# Patient Record
Sex: FEMALE | Race: WHITE | NOT HISPANIC OR LATINO | URBAN - METROPOLITAN AREA
[De-identification: names, ages, dates, MRNs, and addresses within clinical notes are randomized per-mention and may not be internally consistent; named-entity substitution may affect disease eponyms.]

---

## 2017-06-09 ENCOUNTER — FOLLOW UP ESTABLISHED (OUTPATIENT)
Dept: URBAN - METROPOLITAN AREA CLINIC 56 | Facility: CLINIC | Age: 42
End: 2017-06-09
Payer: COMMERCIAL

## 2017-06-09 DIAGNOSIS — H54.42 BLINDNESS, LEFT EYE, NORMAL VISION RIGHT EYE: ICD-10-CM

## 2017-06-09 DIAGNOSIS — H33.052 TOTAL RETINAL DETACHMENT, LEFT EYE: ICD-10-CM

## 2017-06-09 PROCEDURE — 92014 COMPRE OPH EXAM EST PT 1/>: CPT | Performed by: OPTOMETRIST

## 2017-06-09 ASSESSMENT — KERATOMETRY
OS: 42.73
OD: 42.51

## 2017-06-09 ASSESSMENT — VISUAL ACUITY
OD: 20/20
OS: 20/125

## 2017-06-09 ASSESSMENT — INTRAOCULAR PRESSURE
OS: 13
OD: 20

## 2021-01-05 ENCOUNTER — NEW PATIENT (OUTPATIENT)
Dept: URBAN - METROPOLITAN AREA CLINIC 56 | Facility: CLINIC | Age: 46
End: 2021-01-05
Payer: COMMERCIAL

## 2021-01-05 DIAGNOSIS — H52.13 MYOPIA, BILATERAL: ICD-10-CM

## 2021-01-05 DIAGNOSIS — H26.102 UNSPECIFIED TRAUMATIC CATARACT, LEFT EYE: ICD-10-CM

## 2021-01-05 PROCEDURE — 92004 COMPRE OPH EXAM NEW PT 1/>: CPT | Performed by: OPTOMETRIST

## 2021-01-05 ASSESSMENT — VISUAL ACUITY
OD: 20/20
OS: 20/200

## 2021-01-05 ASSESSMENT — KERATOMETRY
OD: 42.65
OS: 43.09

## 2021-01-05 ASSESSMENT — INTRAOCULAR PRESSURE
OS: 12
OD: 22

## 2021-02-01 ENCOUNTER — FOLLOW UP ESTABLISHED (OUTPATIENT)
Dept: URBAN - METROPOLITAN AREA CLINIC 56 | Facility: CLINIC | Age: 46
End: 2021-02-01
Payer: COMMERCIAL

## 2021-02-01 PROCEDURE — 92014 COMPRE OPH EXAM EST PT 1/>: CPT | Performed by: OPTOMETRIST

## 2021-02-01 ASSESSMENT — INTRAOCULAR PRESSURE
OS: 13
OD: 22

## 2021-02-01 ASSESSMENT — VISUAL ACUITY
OD: 20/20
OS: 20/150

## 2021-02-01 ASSESSMENT — KERATOMETRY
OS: 43.16
OD: 42.62

## 2022-09-30 ENCOUNTER — OFFICE VISIT (OUTPATIENT)
Dept: URBAN - METROPOLITAN AREA CLINIC 56 | Facility: CLINIC | Age: 47
End: 2022-09-30
Payer: COMMERCIAL

## 2022-09-30 DIAGNOSIS — H26.102 UNSPECIFIED TRAUMATIC CATARACT, LEFT EYE: ICD-10-CM

## 2022-09-30 DIAGNOSIS — H52.13 MYOPIA, BILATERAL: Primary | ICD-10-CM

## 2022-09-30 DIAGNOSIS — H33.052 TOTAL RETINAL DETACHMENT, LEFT EYE: ICD-10-CM

## 2022-09-30 PROCEDURE — 92014 COMPRE OPH EXAM EST PT 1/>: CPT | Performed by: STUDENT IN AN ORGANIZED HEALTH CARE EDUCATION/TRAINING PROGRAM

## 2022-09-30 ASSESSMENT — KERATOMETRY
OS: 43.06
OD: 42.45

## 2022-09-30 ASSESSMENT — VISUAL ACUITY
OS: 20/200
OD: 20/20

## 2022-09-30 ASSESSMENT — INTRAOCULAR PRESSURE
OD: 18
OS: 15

## 2022-09-30 NOTE — IMPRESSION/PLAN
Impression: Traumatic cataract of left eye Plan: With zonular dehiscence. Patient notes no significant change in vision OS. No pain. Will continue to monitor.

## 2022-11-29 ENCOUNTER — OFFICE VISIT (OUTPATIENT)
Dept: URBAN - METROPOLITAN AREA CLINIC 56 | Facility: LOCATION | Age: 47
End: 2022-11-29
Payer: COMMERCIAL

## 2022-11-29 DIAGNOSIS — H04.123 DRY EYE SYNDROME OF BILATERAL LACRIMAL GLANDS: ICD-10-CM

## 2022-11-29 DIAGNOSIS — H26.102 UNSPECIFIED TRAUMATIC CATARACT, LEFT EYE: ICD-10-CM

## 2022-11-29 DIAGNOSIS — H33.052 TOTAL RETINAL DETACHMENT, LEFT EYE: ICD-10-CM

## 2022-11-29 DIAGNOSIS — H57.11 OCULAR PAIN, RIGHT EYE: Primary | ICD-10-CM

## 2022-11-29 PROCEDURE — 92014 COMPRE OPH EXAM EST PT 1/>: CPT | Performed by: OPTOMETRIST

## 2022-11-29 PROCEDURE — 92134 CPTRZ OPH DX IMG PST SGM RTA: CPT | Performed by: OPTOMETRIST

## 2022-11-29 ASSESSMENT — INTRAOCULAR PRESSURE
OS: 13
OD: 19

## 2022-11-29 ASSESSMENT — KERATOMETRY
OS: 43.06
OD: 42.51

## 2022-11-29 NOTE — IMPRESSION/PLAN
Impression: Ocular pain, right eye: H57.11.
-single episode sharp pain upon waking. Plan: Discussed with patient, pain may be more related to dryness. 
See plan under L72.375

## 2022-11-29 NOTE — IMPRESSION/PLAN
Impression: Dry eye syndrome of bilateral lacrimal glands: H04.123. Plan: Recommend Systane Gel (tube) at night OU.

## 2023-09-29 ENCOUNTER — OFFICE VISIT (OUTPATIENT)
Dept: URBAN - METROPOLITAN AREA CLINIC 56 | Facility: LOCATION | Age: 48
End: 2023-09-29
Payer: COMMERCIAL

## 2023-09-29 DIAGNOSIS — H33.052 TOTAL RETINAL DETACHMENT, LEFT EYE: ICD-10-CM

## 2023-09-29 DIAGNOSIS — H25.811 COMBINED FORMS OF AGE-RELATED CATARACT, RIGHT EYE: ICD-10-CM

## 2023-09-29 DIAGNOSIS — H26.102 UNSPECIFIED TRAUMATIC CATARACT, LEFT EYE: Primary | ICD-10-CM

## 2023-09-29 DIAGNOSIS — H52.13 MYOPIA, BILATERAL: ICD-10-CM

## 2023-09-29 PROCEDURE — 92134 CPTRZ OPH DX IMG PST SGM RTA: CPT | Performed by: OPTOMETRIST

## 2023-09-29 PROCEDURE — 92014 COMPRE OPH EXAM EST PT 1/>: CPT | Performed by: OPTOMETRIST

## 2023-09-29 ASSESSMENT — VISUAL ACUITY
OD: 20/20
OS: 20/200

## 2023-09-29 ASSESSMENT — KERATOMETRY
OD: 42.53
OS: 43.03

## 2023-09-29 ASSESSMENT — INTRAOCULAR PRESSURE
OS: 14
OD: 14

## 2025-01-17 ENCOUNTER — OFFICE VISIT (OUTPATIENT)
Dept: URBAN - METROPOLITAN AREA CLINIC 56 | Facility: LOCATION | Age: 50
End: 2025-01-17
Payer: COMMERCIAL

## 2025-01-17 DIAGNOSIS — H43.391 OTHER VITREOUS OPACITIES, RIGHT EYE: ICD-10-CM

## 2025-01-17 DIAGNOSIS — H33.052 TOTAL RETINAL DETACHMENT, LEFT EYE: ICD-10-CM

## 2025-01-17 DIAGNOSIS — H52.13 MYOPIA, BILATERAL: ICD-10-CM

## 2025-01-17 DIAGNOSIS — H25.811 COMBINED FORMS OF AGE-RELATED CATARACT, RIGHT EYE: Primary | ICD-10-CM

## 2025-01-17 DIAGNOSIS — H26.102 UNSPECIFIED TRAUMATIC CATARACT, LEFT EYE: ICD-10-CM

## 2025-01-17 PROCEDURE — 92134 CPTRZ OPH DX IMG PST SGM RTA: CPT | Performed by: OPTOMETRIST

## 2025-01-17 PROCEDURE — 92014 COMPRE OPH EXAM EST PT 1/>: CPT | Performed by: OPTOMETRIST

## 2025-01-17 ASSESSMENT — INTRAOCULAR PRESSURE
OS: 16
OD: 14

## 2025-01-17 ASSESSMENT — KERATOMETRY
OS: 42.78
OD: 42.51

## 2025-01-17 ASSESSMENT — VISUAL ACUITY
OS: 20/250
OD: 20/20

## 2025-05-13 ENCOUNTER — OFFICE VISIT (OUTPATIENT)
Dept: URBAN - METROPOLITAN AREA CLINIC 56 | Facility: LOCATION | Age: 50
End: 2025-05-13
Payer: COMMERCIAL

## 2025-05-13 DIAGNOSIS — T26.00XA: Primary | ICD-10-CM

## 2025-05-13 PROCEDURE — 99213 OFFICE O/P EST LOW 20 MIN: CPT | Performed by: OPTOMETRIST

## 2025-05-13 ASSESSMENT — INTRAOCULAR PRESSURE
OS: 16
OD: 15